# Patient Record
Sex: MALE | Race: WHITE | NOT HISPANIC OR LATINO | Employment: FULL TIME | URBAN - METROPOLITAN AREA
[De-identification: names, ages, dates, MRNs, and addresses within clinical notes are randomized per-mention and may not be internally consistent; named-entity substitution may affect disease eponyms.]

---

## 2021-12-09 ENCOUNTER — HOSPITAL ENCOUNTER (EMERGENCY)
Facility: CLINIC | Age: 26
Discharge: HOME | End: 2021-12-09
Attending: PHYSICIAN ASSISTANT | Admitting: PHYSICIAN ASSISTANT
Payer: COMMERCIAL

## 2021-12-09 ENCOUNTER — APPOINTMENT (OUTPATIENT)
Dept: GENERAL RADIOLOGY | Facility: CLINIC | Age: 26
End: 2021-12-09
Attending: PHYSICIAN ASSISTANT
Payer: COMMERCIAL

## 2021-12-09 VITALS
TEMPERATURE: 99.4 F | HEART RATE: 87 BPM | HEIGHT: 74 IN | WEIGHT: 270 LBS | OXYGEN SATURATION: 96 % | RESPIRATION RATE: 20 BRPM | BODY MASS INDEX: 34.65 KG/M2 | DIASTOLIC BLOOD PRESSURE: 69 MMHG | SYSTOLIC BLOOD PRESSURE: 108 MMHG

## 2021-12-09 DIAGNOSIS — R07.81 RIB PAIN ON LEFT SIDE: ICD-10-CM

## 2021-12-09 DIAGNOSIS — J10.1 INFLUENZA A: ICD-10-CM

## 2021-12-09 LAB
FLUAV RNA SPEC QL NAA+PROBE: POSITIVE
FLUBV RNA RESP QL NAA+PROBE: NEGATIVE
SARS-COV-2 RNA RESP QL NAA+PROBE: NEGATIVE

## 2021-12-09 PROCEDURE — 71045 X-RAY EXAM CHEST 1 VIEW: CPT

## 2021-12-09 PROCEDURE — C9803 HOPD COVID-19 SPEC COLLECT: HCPCS | Performed by: PHYSICIAN ASSISTANT

## 2021-12-09 PROCEDURE — 250N000013 HC RX MED GY IP 250 OP 250 PS 637: Performed by: PHYSICIAN ASSISTANT

## 2021-12-09 PROCEDURE — 87636 SARSCOV2 & INF A&B AMP PRB: CPT | Performed by: PHYSICIAN ASSISTANT

## 2021-12-09 PROCEDURE — 99284 EMERGENCY DEPT VISIT MOD MDM: CPT | Mod: 25 | Performed by: PHYSICIAN ASSISTANT

## 2021-12-09 PROCEDURE — 99284 EMERGENCY DEPT VISIT MOD MDM: CPT | Performed by: PHYSICIAN ASSISTANT

## 2021-12-09 RX ORDER — OXYCODONE HYDROCHLORIDE 5 MG/1
5 TABLET ORAL ONCE
Status: COMPLETED | OUTPATIENT
Start: 2021-12-09 | End: 2021-12-09

## 2021-12-09 RX ORDER — HYDROCODONE BITARTRATE AND ACETAMINOPHEN 5; 325 MG/1; MG/1
1 TABLET ORAL EVERY 6 HOURS PRN
Qty: 10 TABLET | Refills: 0 | Status: SHIPPED | OUTPATIENT
Start: 2021-12-09

## 2021-12-09 RX ADMIN — OXYCODONE HYDROCHLORIDE 5 MG: 5 TABLET ORAL at 20:06

## 2021-12-09 ASSESSMENT — ENCOUNTER SYMPTOMS
FEVER: 1
COUGH: 1
SHORTNESS OF BREATH: 0
SORE THROAT: 1

## 2021-12-09 ASSESSMENT — MIFFLIN-ST. JEOR: SCORE: 2279.46

## 2021-12-10 NOTE — DISCHARGE INSTRUCTIONS
Stop taking your Flexeril and Robitussin with Codeine.  You may take Norco for your left-sided rib pain associated with coughing.  Take this with food.  Do not work or drive while taking this medication.  Continue taking your Tamiflu as prescribed.  Return for fevers, vomiting, shortness of breath, or any other worsening symptoms.

## 2021-12-10 NOTE — ED PROVIDER NOTES
History     Chief Complaint   Patient presents with     Cough     HPI  Savage Camacho is a 26 year old male who presents with complaints of left-sided rib pain today from coughing so much.  Pt states he became ill with cough, sore throat, and fevers since yesterday morning.  Pt states he feels like he pulled a muscle on the left side of his chest from coughing so much.  Pt was evaluated at an outside urgent care this morning for the same symptoms and had testing for Influenza and COVID.  Influenza testing was negative and COVID-19 testing is still pending.  Pt was treated empirically for influenza with Tamiflu as patient had close contact with his significant other's son who tested positive for influenza A (it has been spreading through his ).  Pt is not immunized against COVID-19.  Denies shortness of breath, nausea, vomiting, diarrhea, abdominal pain, or rash.  No hx asthma or underlying lung disease.  Patient was prescribed Flexeril and Robitussin with codeine for his cough and left rib pain which he states he has been taking without improvement in symptoms.      Allergies:  No Known Allergies    Problem List:    There are no problems to display for this patient.       Past Medical History:    History reviewed. No pertinent past medical history.    Past Surgical History:    History reviewed. No pertinent surgical history.    Family History:    No family history on file.    Social History:  Marital Status:  Single [1]  Social History     Tobacco Use     Smoking status: Current Every Day Smoker     Packs/day: 0.25     Years: 1.00     Pack years: 0.25     Types: Cigarettes     Smokeless tobacco: Never Used   Substance Use Topics     Alcohol use: Yes     Comment: occasional     Drug use: Never        Medications:    HYDROcodone-acetaminophen (NORCO) 5-325 MG tablet          Review of Systems   Constitutional: Positive for fever.   HENT: Positive for sore throat.    Respiratory: Positive for cough. Negative  "for shortness of breath.    Cardiovascular:        Left-sided rib pain   Skin: Negative.    All other systems reviewed and are negative.      Physical Exam   BP: 114/52  Pulse: 95  Temp: 99.4  F (37.4  C)  Resp: 20  Height: 188 cm (6' 2\")  Weight: 122.5 kg (270 lb)  SpO2: 96 %      Physical Exam  Constitutional:       General: He is not in acute distress.     Appearance: Normal appearance. He is well-developed. He is ill-appearing. He is not toxic-appearing or diaphoretic.   HENT:      Head: Normocephalic and atraumatic.      Right Ear: Tympanic membrane, ear canal and external ear normal.      Left Ear: Tympanic membrane, ear canal and external ear normal.      Nose: Nose normal. No congestion or rhinorrhea.      Mouth/Throat:      Lips: Pink.      Mouth: Mucous membranes are moist.      Pharynx: Uvula midline. No pharyngeal swelling, oropharyngeal exudate, posterior oropharyngeal erythema or uvula swelling.      Tonsils: No tonsillar exudate or tonsillar abscesses.   Eyes:      Extraocular Movements: Extraocular movements intact.      Conjunctiva/sclera: Conjunctivae normal.      Pupils: Pupils are equal, round, and reactive to light.   Cardiovascular:      Rate and Rhythm: Normal rate and regular rhythm.      Heart sounds: Normal heart sounds.   Pulmonary:      Effort: Pulmonary effort is normal. No respiratory distress.      Breath sounds: Normal breath sounds and air entry. No stridor, decreased air movement or transmitted upper airway sounds. No decreased breath sounds, wheezing, rhonchi or rales.      Comments: Left lateral chest wall tenderness to palpation  Chest:      Chest wall: Tenderness present.   Abdominal:      Palpations: Abdomen is soft.      Tenderness: There is no abdominal tenderness.   Musculoskeletal:         General: Normal range of motion.      Cervical back: Full passive range of motion without pain, normal range of motion and neck supple. No rigidity. Normal range of motion. "   Lymphadenopathy:      Cervical: No cervical adenopathy.   Skin:     General: Skin is warm and dry.      Findings: No rash.   Neurological:      General: No focal deficit present.      Mental Status: He is alert and oriented to person, place, and time.   Psychiatric:         Mood and Affect: Mood normal.         Behavior: Behavior is cooperative.         ED Course                 Procedures    Results for orders placed or performed during the hospital encounter of 12/09/21 (from the past 24 hour(s))   Symptomatic Influenza A/B & SARS-CoV2 (COVID-19) Virus PCR Multiplex Nasopharyngeal    Specimen: Nasopharyngeal; Swab   Result Value Ref Range    Influenza A PCR Positive (A) Negative    Influenza B PCR Negative Negative    SARS CoV2 PCR Negative Negative    Narrative    Testing was performed using the [employer] SARS-CoV-2 & Influenza A/B Assay on the [employer]ne[employer] System. This test should be ordered for the detection of SARS-CoV-2 and influenza viruses in individuals who meet clinical and/or epidemiological criteria. Test performance is unknown in asymptomatic patients. This test is for in vitro diagnostic use under the FDA EUA for laboratories certified under CLIA to perform moderate and/or high complexity testing. This test has not been FDA cleared or approved. A negative result does not rule out the presence of PCR inhibitors in the specimen or target RNA in concentration below the limit of detection for the assay. If only one viral target is positive but coinfection with multiple targets is suspected, the sample should be re-tested with another FDA cleared, approved or authorized test, if coinfection would change clinical management. [facility] are certified under the Clinical Laboratory Improvement Amendments of 1988 (CLIA-88) as  qualified to perform moderate and/or high complexity laboratory testing.   XR Chest Port 1 View    Narrative    EXAM: XR CHEST PORT 1 VIEW  LOCATION: [facility]  [facility]  DATE/TIME: 12/9/2021 7:55 PM    INDICATION: cough  COMPARISON: None.      Impression    IMPRESSION: Negative chest.       Medications   oxyCODONE (ROXICODONE) tablet 5 mg (5 mg Oral Given 12/9/21 2006)       Assessments & Plan (with Medical Decision Making)     Pt is a 26 year old male who presents with complaints of left-sided rib pain today from coughing so much.  Pt states he became ill with cough, sore throat, and fevers since yesterday morning.  Pt states he feels like he pulled a muscle on the left side of his chest from coughing so much.  Pt was evaluated at an outside urgent care this morning for the same symptoms and had testing for Influenza and COVID.  Influenza testing was negative and COVID-19 testing is still pending.  Pt was treated empirically for influenza with Tamiflu as patient had close contact with his significant other's son who tested positive for influenza A (it has been spreading through his ).  Pt is not immunized against COVID-19.  No history of asthma.    Pt is afebrile on arrival.  Exam as above.  Influenza A testing was positive.  COVID-19 testing was negative.  Chest x-ray was negative for pneumonia or acute pathology.  Discussed results with patient.  Patient was given oxycodone here for his left rib pain from coughing and is feeling better.  Encouraged symptomatic treatments at home.  He is not hypoxic.  He is tolerating oral intake without difficulties.  Instructed patient to continue taking his Tamiflu as instructed.  He is requesting something to help with his left rib pain.  Patient was instructed to discontinue taking his previously prescribed Flexeril and Robitussin with codeine.  We will instead prescribe Norco for this rib pain.  Patient expresses understanding of this.  He is also not to drive or work while on this medication.  He understands this.  Return precautions were reviewed.  Hand-outs were provided.    Patient was instructed to follow-up  with PCP virtually in 3-5 days for continued care and management or sooner if new or worsening symptoms.  He is to return to the ED for persistent and/or worsening symptoms.  Patient expressed understanding of the diagnosis and plan and was discharged home in good condition.    I have reviewed the nursing notes.    I have reviewed the findings, diagnosis, plan and need for follow up with the patient.    There are no discharge medications for this patient.      Final diagnoses:   Influenza A   Rib pain on left side       12/9/2021   [facility] EMERGENCY DEPT      Disclaimer:  This note consists of symbols derived from keyboarding, dictation and/or voice recognition software.  As a result, there may be errors in the script that have gone undetected.  Please consider this when interpreting information found in this chart.     Susana Dowell PA-C  12/09/21 [id number]

## 2021-12-10 NOTE — ED NOTES
Pt presents to ED with concerns of cough and left sided rib pain. Pt states went to outside facility UC today and was dx with influenza. Pt took robitussin with codeine, Dayquil, flexeril, and tamiflu at 1130.